# Patient Record
Sex: MALE | Race: AMERICAN INDIAN OR ALASKA NATIVE | NOT HISPANIC OR LATINO | URBAN - METROPOLITAN AREA
[De-identification: names, ages, dates, MRNs, and addresses within clinical notes are randomized per-mention and may not be internally consistent; named-entity substitution may affect disease eponyms.]

---

## 2021-10-09 ENCOUNTER — EMERGENCY (EMERGENCY)
Facility: HOSPITAL | Age: 26
LOS: 1 days | Discharge: HOME | End: 2021-10-09
Admitting: EMERGENCY MEDICINE
Payer: COMMERCIAL

## 2021-10-09 VITALS
DIASTOLIC BLOOD PRESSURE: 78 MMHG | TEMPERATURE: 98 F | HEART RATE: 72 BPM | RESPIRATION RATE: 17 BRPM | OXYGEN SATURATION: 97 % | SYSTOLIC BLOOD PRESSURE: 104 MMHG

## 2021-10-09 VITALS
RESPIRATION RATE: 18 BRPM | TEMPERATURE: 98 F | OXYGEN SATURATION: 94 % | DIASTOLIC BLOOD PRESSURE: 73 MMHG | HEART RATE: 69 BPM | SYSTOLIC BLOOD PRESSURE: 109 MMHG

## 2021-10-09 DIAGNOSIS — F10.129 ALCOHOL ABUSE WITH INTOXICATION, UNSPECIFIED: ICD-10-CM
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
## 2021-10-09 DIAGNOSIS — Z72.89 OTHER PROBLEMS RELATED TO LIFESTYLE: ICD-10-CM

## 2021-10-09 PROCEDURE — 99284 EMERGENCY DEPT VISIT MOD MDM: CPT

## 2021-10-09 NOTE — ED PROVIDER NOTE - PATIENT PORTAL LINK FT
You can access the FollowMyHealth Patient Portal offered by [facility] by registering at the following website: [email or web address]. By joining [facility] FollowMyHealth portal, you will also be able to view your health information using other applications (apps) compatible with our system.

## 2021-10-09 NOTE — ED PROVIDER NOTE - PHYSICAL EXAMINATION
Constitutional: Well appearing, well groomed and dressed, sleeping, arouses to tactile stimulation, alert to person, slurred speech  Head:  NC/AT, symmetric, neck supple  ENMT: Airway patent    Eyes:  Clear bilaterally, pupils, equal 3mm bilat    Cardiac:  Normal rate  Respiratory:  Normal respiratory rate, depth and effort  GI:   Abd soft, non-distended    MSK:  atraumatic  Neuro:  slurred speech, sleeping, arouses to tactile stimulation,  moving all 4 extremities.   Skin:  Skin normal color for race, warm, dry and intact

## 2021-10-09 NOTE — ED PROVIDER NOTE - PROGRESS NOTE DETAILS
Pt aroused from sleep.  Pt alert, oriented, speaking with clear speech, ambulating with steady gait and tolerating PO.  Pts girlfriend updated on pts condition with pts permission.

## 2021-10-09 NOTE — ED PROVIDER NOTE - OBJECTIVE STATEMENT
24 y/o M presents to ED c/o via EMS with AMS.  Per triage note, pt found sleeping in front of a store.  He admits to drinking alcohol and denies drug use.   Pt denies trauma/falls/pain.

## 2021-10-09 NOTE — ED PROVIDER NOTE - CLINICAL SUMMARY MEDICAL DECISION MAKING FREE TEXT BOX
24 y/o M presents to ED with alcohol intoxication.  VSS, NAd.  No overt signs of trauma.  Will continue to observe and reassess.

## 2021-10-09 NOTE — ED ADULT TRIAGE NOTE - CHIEF COMPLAINT QUOTE
Pt BIBA c/o AMS. Admits to ETOH use, unsteady gait on arrival, found sleeping in front of store. No s/sx of trauma present.